# Patient Record
Sex: MALE | Race: BLACK OR AFRICAN AMERICAN | NOT HISPANIC OR LATINO
[De-identification: names, ages, dates, MRNs, and addresses within clinical notes are randomized per-mention and may not be internally consistent; named-entity substitution may affect disease eponyms.]

---

## 2020-08-25 PROBLEM — Z00.00 ENCOUNTER FOR PREVENTIVE HEALTH EXAMINATION: Status: ACTIVE | Noted: 2020-08-25

## 2020-08-27 ENCOUNTER — APPOINTMENT (OUTPATIENT)
Dept: THORACIC SURGERY | Facility: CLINIC | Age: 80
End: 2020-08-27
Payer: MEDICARE

## 2020-08-27 VITALS
HEIGHT: 68 IN | WEIGHT: 151 LBS | DIASTOLIC BLOOD PRESSURE: 82 MMHG | SYSTOLIC BLOOD PRESSURE: 126 MMHG | BODY MASS INDEX: 22.88 KG/M2

## 2020-08-27 DIAGNOSIS — Z85.46 PERSONAL HISTORY OF MALIGNANT NEOPLASM OF PROSTATE: ICD-10-CM

## 2020-08-27 DIAGNOSIS — Z80.9 FAMILY HISTORY OF MALIGNANT NEOPLASM, UNSPECIFIED: ICD-10-CM

## 2020-08-27 DIAGNOSIS — E07.9 DISORDER OF THYROID, UNSPECIFIED: ICD-10-CM

## 2020-08-27 DIAGNOSIS — H54.7 UNSPECIFIED VISUAL LOSS: ICD-10-CM

## 2020-08-27 DIAGNOSIS — Z87.828 PERSONAL HISTORY OF OTHER (HEALED) PHYSICAL INJURY AND TRAUMA: ICD-10-CM

## 2020-08-27 DIAGNOSIS — F17.200 NICOTINE DEPENDENCE, UNSPECIFIED, UNCOMPLICATED: ICD-10-CM

## 2020-08-27 DIAGNOSIS — H35.52 PIGMENTARY RETINAL DYSTROPHY: ICD-10-CM

## 2020-08-27 DIAGNOSIS — Q39.6 CONGENITAL DIVERTICULUM OF ESOPHAGUS: ICD-10-CM

## 2020-08-27 PROCEDURE — 99205 OFFICE O/P NEW HI 60 MIN: CPT

## 2020-08-27 RX ORDER — LOSARTAN POTASSIUM 100 MG/1
TABLET, FILM COATED ORAL
Refills: 0 | Status: ACTIVE | COMMUNITY

## 2020-08-27 RX ORDER — TAMSULOSIN HYDROCHLORIDE 0.4 MG/1
0.4 CAPSULE ORAL
Refills: 0 | Status: ACTIVE | COMMUNITY

## 2020-08-27 RX ORDER — FENOFIBRATE 145 MG/1
TABLET ORAL
Refills: 0 | Status: ACTIVE | COMMUNITY

## 2020-09-01 NOTE — DATA REVIEWED
[FreeTextEntry1] : CT Chest 7/2/2020: stable small pulmonary nodules.  Moderate emphysema.  Right epiphrenic esophageal diverticulum and esophageal dilation with fluid stasis in the esophagus.  \par \par Endoscopy:  6/17/2019: Estensive semiliquid food residue in the esophagus.  There was a large (at least 6 cm), diverticulum at the lower end of the esophagus.  After food could be cleared it was possible to locate the GE junction.  Stomach appeared normal

## 2020-09-01 NOTE — HISTORY OF PRESENT ILLNESS
[FreeTextEntry1] : 78 y/o M current everyday smoker, legally blind pmhx of prostate ca who was referred to our office by his gastroenterologist after obtaining diagnosis of esophageal diverticulum.\par \par He initially presented tp Dr. Baig with cc of dysphagia.  He states that food routinely gets stuck and sometimes results in regurgitation.  Patient admits to weight loss from decreased appetite but denies all other symptoms at this time.

## 2020-09-01 NOTE — ASSESSMENT
[FreeTextEntry1] : 80 y/o M with known esophageal diverticulum growing in size on imaging.  Patient will benefit from robotic assisted esophageal diverticulectomy via robotic right thoracoscopy approach.  Risks and benefits of the procedure have been discussed with patient and family member, Alhaji.  They express understanding and agree. Prior to undergoing procedure, patient will need a barium esophagram as well as cardiac and medical clearance.  He has been instructed to ingest only liquids for the 2 days prior to the procedure to ensure no food remains in the diverticulum.

## 2020-09-01 NOTE — REVIEW OF SYSTEMS
[Abdominal Pain] : no abdominal pain [Vomiting] : no vomiting [Constipation] : no constipation [Diarrhea] : no diarrhea [Heartburn] : no heartburn [Melena] : no melena [FreeTextEntry7] : + dysphgia. + regurg

## 2020-09-16 ENCOUNTER — APPOINTMENT (OUTPATIENT)
Dept: THORACIC SURGERY | Facility: HOSPITAL | Age: 80
End: 2020-09-16

## 2020-10-01 ENCOUNTER — APPOINTMENT (OUTPATIENT)
Dept: THORACIC SURGERY | Facility: CLINIC | Age: 80
End: 2020-10-01

## 2020-11-05 ENCOUNTER — NON-APPOINTMENT (OUTPATIENT)
Age: 80
End: 2020-11-05

## 2020-11-07 ENCOUNTER — NON-APPOINTMENT (OUTPATIENT)
Age: 80
End: 2020-11-07

## 2020-11-09 ENCOUNTER — NON-APPOINTMENT (OUTPATIENT)
Age: 80
End: 2020-11-09

## 2020-11-18 ENCOUNTER — NON-APPOINTMENT (OUTPATIENT)
Age: 80
End: 2020-11-18

## 2020-12-04 ENCOUNTER — NON-APPOINTMENT (OUTPATIENT)
Age: 80
End: 2020-12-04

## 2020-12-09 ENCOUNTER — NON-APPOINTMENT (OUTPATIENT)
Age: 80
End: 2020-12-09

## 2021-02-03 ENCOUNTER — APPOINTMENT (OUTPATIENT)
Dept: THORACIC SURGERY | Facility: HOSPITAL | Age: 81
End: 2021-02-03

## 2021-03-04 ENCOUNTER — APPOINTMENT (OUTPATIENT)
Dept: THORACIC SURGERY | Facility: CLINIC | Age: 81
End: 2021-03-04